# Patient Record
Sex: FEMALE
[De-identification: names, ages, dates, MRNs, and addresses within clinical notes are randomized per-mention and may not be internally consistent; named-entity substitution may affect disease eponyms.]

---

## 2024-10-02 ENCOUNTER — APPOINTMENT (OUTPATIENT)
Dept: GASTROENTEROLOGY | Facility: CLINIC | Age: 19
End: 2024-10-02
Payer: MEDICAID

## 2024-10-02 VITALS
HEART RATE: 87 BPM | OXYGEN SATURATION: 98 % | TEMPERATURE: 97.7 F | WEIGHT: 150 LBS | HEIGHT: 61 IN | DIASTOLIC BLOOD PRESSURE: 69 MMHG | SYSTOLIC BLOOD PRESSURE: 113 MMHG | RESPIRATION RATE: 16 BRPM | BODY MASS INDEX: 28.32 KG/M2

## 2024-10-02 DIAGNOSIS — R14.0 ABDOMINAL DISTENSION (GASEOUS): ICD-10-CM

## 2024-10-02 DIAGNOSIS — K83.8 OTHER SPECIFIED DISEASES OF BILIARY TRACT: ICD-10-CM

## 2024-10-02 DIAGNOSIS — Z80.0 FAMILY HISTORY OF MALIGNANT NEOPLASM OF DIGESTIVE ORGANS: ICD-10-CM

## 2024-10-02 DIAGNOSIS — Z78.9 OTHER SPECIFIED HEALTH STATUS: ICD-10-CM

## 2024-10-02 DIAGNOSIS — R10.12 LEFT UPPER QUADRANT PAIN: ICD-10-CM

## 2024-10-02 DIAGNOSIS — R10.10 UPPER ABDOMINAL PAIN, UNSPECIFIED: ICD-10-CM

## 2024-10-02 PROBLEM — Z00.00 ENCOUNTER FOR PREVENTIVE HEALTH EXAMINATION: Status: ACTIVE | Noted: 2024-10-02

## 2024-10-02 PROCEDURE — 99205 OFFICE O/P NEW HI 60 MIN: CPT

## 2024-10-02 RX ORDER — OMEPRAZOLE 40 MG/1
40 CAPSULE, DELAYED RELEASE ORAL
Refills: 0 | Status: ACTIVE | COMMUNITY

## 2024-10-02 RX ORDER — FAMOTIDINE 10 MG/1
TABLET, FILM COATED ORAL
Refills: 0 | Status: ACTIVE | COMMUNITY

## 2024-10-02 NOTE — ASSESSMENT
[FreeTextEntry1] : Ms. Genie Moise is a 18-year-old female with no significant past medical history who presents for consultation for abdominal pain.   - Will obtain images from MRI and review with radiologist for further evaluation of imaging.  - CBC, CMP, CEA, GI 19-9, amylase and lipase to rule out other etiologies  - Based on results of above will consider patient for EUS +/- ERCP for further evaluation and direct visualization

## 2024-10-02 NOTE — ADDENDUM
[FreeTextEntry1] : I, Ruth Clemons NP, acted as scribe for Dr. Jase Nguyễn for this patient encounter.   I have personally seen and examined the patient. I agree with the history, physical examination, assessment and recommendations as noted above.  Jase Nguyễn MD Gastroenterology

## 2024-10-02 NOTE — PHYSICAL EXAM

## 2024-10-02 NOTE — HISTORY OF PRESENT ILLNESS
[FreeTextEntry1] : Ms. Genie Moise is a 18-year-old female with no significant past medical history who presents for consultation for abdominal pain. Patient reports for about 4 years now she has experienced severe abdominal pain/cramping and bloating, which got progressively worse since May of this year prompting her to visit a GI doctor. An upper endoscopy was performed 6/25/24 with mild inflammation in gastric antrum, and a medium amount of food residue found in the gastric body, biopsy results not available for review. She had an MRI abdomen performed 6/21/24 significant for CBD dilation of 10mm, no choledocholithiasis detected. Patient reports 2 years ago she was admitted to the hospital for the above symptoms, was told at that time she also had a dilated CBD that was attributed to her passing a stone. No interventions performed at that time. The pain subsided for a bit and patient went to [facility] for about a month this past July. Abdominal pain and bloating returned last month prompting an urgent care visit, where she was subsequently referred to [facility] ER for further management. Pain is primarily in bilateral upper abdomen, worse in LUQ.  RUQ US and CT A/P (9/29/24) unremarkable, no CBD dilatation seen on CT. No recent labs available for review. No history of pancreatitis. She was placed on pantoprazole and pepcid upon discharge with minimal relief of symptoms. She also reports associated early satiety, pain worsened with food, and increased flatulence/belching. Does endorse acid reflux. No nausea, vomiting, dysphagia, or unintentional weight loss. She has had loose stools for the past 1.5 weeks. No rectal bleeding or melena. Denies tobacco, alcohol and illicit drug use. Father passed away at 52 from pancreatic cancer last year.